# Patient Record
(demographics unavailable — no encounter records)

---

## 2024-12-14 NOTE — HISTORY OF PRESENT ILLNESS
[de-identified] : fever started yesterday tmax 102, sleeping more still eating well per mom  more drooling mom thinks possible teething pt pulling at both ears, last dose mot at 1230  [FreeTextEntry6] : in addition to above: fever x 2 days touching ears this AM No V/D Good w/d not tolerating po antipyretics, mom put it in his bottle at noon, but patient would not finish it, mom having trouble giving it to him orally

## 2024-12-14 NOTE — PHYSICAL EXAM
[Normocephalic] : normocephalic [Clear Rhinorrhea] : clear rhinorrhea [Erythematous Oropharynx] : erythematous oropharynx [Tooth Eruption] : tooth eruption  [Inflamed Ging[place]] : gingiva not inflamed [Vesicles] : no vesicles [Exudate] : no exudate [Ulcerative Lesions] : no ulcerative lesions [NL] : warm, clear

## 2024-12-14 NOTE — PLAN
[TextEntry] : VIRAL INFECTION:   - Discussed viral etiology and rationale for treatment - Maintain hydration, make sure your child drinks small sips of fluids throughout the day. It's normal that they're not eating like they usually do, but hydration is key - Symptomatic treatment with acetaminophen or ibuprofen prn - Supportive care with fluids and rest - Follow up if symptoms are worsening

## 2025-01-23 NOTE — PHYSICAL EXAM
[Alert] : alert [Normocephalic] : normocephalic [Closed Anterior [place]] : closed anterior fontanelle [Red Reflex] : red reflex bilateral [PERRL] : PERRL [Normally Placed Ears] : normally placed ears [Auricles Well Formed] : auricles well formed [Clear Tympanic membranes] : clear tympanic membranes [Light reflex present] : light reflex present [Bony landmarks visible] : bony landmarks visible [Nares Patent] : nares patent [Palate Intact] : palate intact [Uvula Midline] : uvula midline [Tooth Eruption] : tooth eruption [Supple, full passive range of motion] : supple, full passive range of motion [Symmetric Chest Rise] : symmetric chest rise [Regular Rate and Rhythm] : regular rate and rhythm [S1, S2 present] : S1, S2 present [+2 Femoral Pulses] : (+) 2 femoral pulses [Soft] : soft [Bowel Sounds] : normoactive bowel sounds [Central Urethral Opening] : central urethral opening [Testicles Descended] : testicles descended bilaterally [No Abnormal Lymph Nodes Palpated] : no abnormal lymph nodes palpated [Symmetric Abduction and Rotation of Hips] : symmetric abduction and rotation of hips [Straight] : straight [Cranial Nerves Grossly Intact] : cranial nerves grossly intact [Discharge] : no discharge [Palpable Masses] : no palpable masses [Murmurs] : no murmurs [Tender] : nontender [Distended] : nondistended [Splenomegaly] : no splenomegaly [Hepatomegaly] : no hepatomegaly [Allis Sign] : negative Allis sign [Rash or Lesions] : no rash/lesions [de-identified] : rhonchi b/l, good air entry b/l. AFTER ALBUTEROL X 1 MUCH IMPROVEMENT

## 2025-01-23 NOTE — DISCUSSION/SUMMARY
[FreeTextEntry1] : NEBS AS DISCUSSED TID Q4-6 HR PRN IF WORSENING OR FEVER, RTO OTHERWISE F/U 5-7 DAYS/ IF IMPROVED, OK FOR VACCINES  Transition to whole cow's milk. Continue table foods, 3 meals with 2-3 snacks per day. Incorporate up to 6 oz of flourinated water daily in a sippy cup. Brush teeth twice a day with soft toothbrush. Recommend visit to dentist. When in car, keep child in rear-facing car seats until age 2, or until  the maximum height and weight for seat is reached. Put baby to sleep in own crib with no loose or soft bedding. Lower crib matress. Help baby to maintain consistent daily routines and sleep schedule. Recognize stranger and separation anxiety. Ensure home is safe since baby is increasingly mobile. Be within arm's reach of baby at all times. Use consistent, positive discipline. Avoid screen time. Read aloud to baby. f/u for 15 month [facility]

## 2025-01-23 NOTE — PHYSICAL EXAM
[Alert] : alert [Normocephalic] : normocephalic [Closed Anterior [place]] : closed anterior fontanelle [Red Reflex] : red reflex bilateral [PERRL] : PERRL [Normally Placed Ears] : normally placed ears [Auricles Well Formed] : auricles well formed [Clear Tympanic membranes] : clear tympanic membranes [Light reflex present] : light reflex present [Bony landmarks visible] : bony landmarks visible [Nares Patent] : nares patent [Palate Intact] : palate intact [Uvula Midline] : uvula midline [Tooth Eruption] : tooth eruption [Supple, full passive range of motion] : supple, full passive range of motion [Symmetric Chest Rise] : symmetric chest rise [Regular Rate and Rhythm] : regular rate and rhythm [S1, S2 present] : S1, S2 present [+2 Femoral Pulses] : (+) 2 femoral pulses [Soft] : soft [Bowel Sounds] : normoactive bowel sounds [Central Urethral Opening] : central urethral opening [Testicles Descended] : testicles descended bilaterally [No Abnormal Lymph Nodes Palpated] : no abnormal lymph nodes palpated [Symmetric Abduction and Rotation of Hips] : symmetric abduction and rotation of hips [Straight] : straight [Cranial Nerves Grossly Intact] : cranial nerves grossly intact [Discharge] : no discharge [Palpable Masses] : no palpable masses [Murmurs] : no murmurs [Tender] : nontender [Distended] : nondistended [Splenomegaly] : no splenomegaly [Hepatomegaly] : no hepatomegaly [Allis Sign] : negative Allis sign [Rash or Lesions] : no rash/lesions [de-identified] : rhonchi b/l, good air entry b/l. AFTER ALBUTEROL X 1 MUCH IMPROVEMENT

## 2025-01-23 NOTE — DISCUSSION/SUMMARY
[FreeTextEntry1] : NEBS AS DISCUSSED TID Q4-6 HR PRN IF WORSENING OR FEVER, RTO OTHERWISE F/U 5-7 DAYS/ IF IMPROVED, OK FOR VACCINES  Transition to whole cow's milk. Continue table foods, 3 meals with 2-3 snacks per day. Incorporate up to 6 oz of flourinated water daily in a sippy cup. Brush teeth twice a day with soft toothbrush. Recommend visit to dentist. When in car, keep child in rear-facing car seats until age 2, or until  the maximum height and weight for seat is reached. Put baby to sleep in own crib with no loose or soft bedding. Lower crib matress. Help baby to maintain consistent daily routines and sleep schedule. Recognize stranger and separation anxiety. Ensure home is safe since baby is increasingly mobile. Be within arm's reach of baby at all times. Use consistent, positive discipline. Avoid screen time. Read aloud to baby. f/u for 15 month [facility]

## 2025-01-23 NOTE — HISTORY OF PRESENT ILLNESS
[FreeTextEntry1] : FEEDING: Tolerating feeds well. BF- formula every 2-3 hours. SLEEP:  No issues. ELIMINATION: Frequent urination. Stools daily, soft. SAFETY: Rear facing car seat No exposure to cigarette smoking. CONCERNS: cough x 2 weeks no labored breahting no fever feeding well sleeping good mom hears noisy breathing no neb at home

## 2025-01-23 NOTE — REVIEW OF SYSTEMS
[Nasal Congestion] : nasal congestion [Cough] : cough [Negative] : Genitourinary Cibinqo Pregnancy And Lactation Text: It is unknown if this medication will adversely affect pregnancy or breast feeding.  You should not take this medication if you are currently pregnant or planning a pregnancy or while breastfeeding.

## 2025-03-06 NOTE — DISCUSSION/SUMMARY
[FreeTextEntry1] : looks like tail end of illness lesions improving- keep an eye out for any new lesions if none, can return to  monday  rec returning this coming for 12 month vaccines father agrees with plan

## 2025-03-06 NOTE — PHYSICAL EXAM
[NL] : moves all extremities x4, warm, well perfused x4 [de-identified] : crusted over lesions lower face/chin. macules that dara few palms and soles of feet no blisters

## 2025-03-06 NOTE — HISTORY OF PRESENT ILLNESS
[de-identified] : As per Parent, Pt HERE FOR RECHECK OF [employer], STILL HAVING BLISTERS ON MOUTH. [FreeTextEntry6] : no fever good po noticed bumps on hands and feet also lesions face crusted over lesions hands feet were never blisters nasal congestion

## 2025-04-03 NOTE — PHYSICAL EXAM
[Alert] : alert [No Acute Distress] : no acute distress [Normocephalic] : normocephalic [Anterior [place] Closed] : anterior fontanelle closed [Red Reflex Bilateral] : red reflex bilateral [PERRL] : PERRL [Normally Placed Ears] : normally placed ears [Auricles Well Formed] : auricles well formed [Clear Tympanic membranes with present light reflex and bony landmarks] : clear tympanic membranes with present light reflex and bony landmarks [No Discharge] : no discharge [Nares Patent] : nares patent [Palate Intact] : palate intact [Uvula Midline] : uvula midline [Tooth Eruption] : tooth eruption  [Supple, full passive range of motion] : supple, full passive range of motion [No Palpable Masses] : no palpable masses [Symmetric Chest Rise] : symmetric chest rise [Clear to Auscultation Bilaterally] : clear to auscultation bilaterally [Regular Rate and Rhythm] : regular rate and rhythm [S1, S2 present] : S1, S2 present [No Murmurs] : no murmurs [+2 Femoral Pulses] : +2 femoral pulses [Soft] : soft [NonTender] : non tender [Non Distended] : non distended [Normoactive Bowel Sounds] : normoactive bowel sounds [No Hepatomegaly] : no hepatomegaly [No Splenomegaly] : no splenomegaly [Central Urethral Opening] : central urethral opening [Testicles Descended Bilaterally] : testicles descended bilaterally [Patent] : patent [Normally Placed] : normally placed [No Abnormal Lymph Nodes Palpated] : no abnormal lymph nodes palpated [No Clavicular Crepitus] : no clavicular crepitus [Negative Pe[place]-Ortalani] : negative Perea-Ortalani [Symmetric Buttocks Creases] : symmetric buttocks creases [No Spinal Dimple] : no spinal dimple [NoTuft of Hair] : no tuft of hair [Cranial Nerves Grossly Intact] : cranial nerves grossly intact [No Rash or Lesions] : no rash or lesions [de-identified] : upper lip toward right healed small lac, no crusting no swelling. teeth no discoloration no swelling or discoloration of gums

## 2025-04-03 NOTE — DISCUSSION/SUMMARY
[Communication and Social Development] : communication and social development [Sleep Routines and Issues] : sleep routines and issues [Temper Tantrums and Discipline] : temper tantrums and discipline [Healthy Teeth] : healthy teeth [Safety] : safety [] : The components of the vaccine(s) to be administered today are listed in the plan of care. The disease(s) for which the vaccine(s) are intended to prevent and the risks have been discussed with the caretaker.  The risks are also included in the appropriate vaccination information statements which have been provided to the patient's caregiver.  The caregiver has given consent to vaccinate. [FreeTextEntry1] : if any discoloration of gums or teeth, to dentist area already looks healed up observe closely for redness or swelling  Continue whole cow's milk. Continue table foods, 3 meals with 2-3 snacks per day. Incorporate flourinated water daily in a sippy cup. Brush teeth twice a day with soft toothbrush. Recommend visit to dentist. When in car, keep child in rear-facing car seats until age 2, or until  the maximum height and weight for seat is reached. Put baby to sleep in own crib. Lower crib matress. Help baby to maintain consistent daily routines and sleep schedule. Recognize stranger and separation anxiety. Ensure home is safe since baby is increasingly mobile. Be within arm's reach of baby at all times. Use consistent, positive discipline. Read aloud to baby.  Return in 3 mo for 18 mo well child check.

## 2025-04-03 NOTE — HISTORY OF PRESENT ILLNESS
[Mother] : mother [FreeTextEntry7] : 15 mo [facility] [FreeTextEntry1] :  Patient brought here by parent.  Patient tolerating feeds well. Table food TID. Drinks milk BID, water. Using cup. Sleeping well. Normal BM.s No concerns with behavior. Brushing teeth.  CONCERNS: fell off ride on toy hit mouth upper lip bled a lot and was swollen now looks fine, no tooth discoloration eating fine

## 2025-04-03 NOTE — DEVELOPMENTAL MILESTONES
[Uses 3 words other than names] : uses 3 words other than names [Speaks in sounds that seem like] : speaks in sounds that seem like an unknown language [FreeTextEntry1] : [place] PRESCREENING DEVELOPMENTAL QUESTIONNAIRE REVIEWED. PASSED ALL AGE APPROPRIATE DEVELOPMENTAL  MILESTONES.

## 2025-05-21 NOTE — HISTORY OF PRESENT ILLNESS
[de-identified] : Mom reports pt has been with a fever, cough and congestion since saturday. Tmax of 102 yesterday. Afebrile today. Diarrhea x1 yesterday as per mom. Mom denied rectal temp. [FreeTextEntry6] : 3 days of fever, wet cough, rattling sound in chest with breathing. Appetite is decreased but he is drinking well.  Has nebulizer device at home. Has not needed albuterol- mom states it makes him very jittery.

## 2025-05-21 NOTE — PHYSICAL EXAM
[Clear] : right tympanic membrane not clear [Erythema] : erythema [Bulging] : bulging [Clear Rhinorrhea] : clear rhinorrhea [NL] : warm, clear [FreeTextEntry7] : transmitted upper airway sounds

## 2025-05-21 NOTE — DISCUSSION/SUMMARY
[FreeTextEntry1] : Saline neb given. Complete 10 days of antibiotic. Provide ibuprofen or acetaminophen as needed for pain or fever. If no improvement within 72 hours return for re-evaluation. Follow up in 2-3 wks Supportive measures for upper respiratory infection were discussed. Such measures include use of nasal saline and suction as needed to clear the nasal passages, increasing fluids, hot showers or steam from the bathroom, propping the child up on a second pillow (for children > 1year old), use of an OTC home remedy such as vapo rub for comfort and giving 1 tablespoon of honey an hour before bedtime for cough.  Ibuprofen or acetaminophen can be used for fever or pain as per 's instructions. Return for new or worsening symptoms.

## 2025-07-09 NOTE — PHYSICAL EXAM
[Alert] : alert [No Acute Distress] : no acute distress [Normocephalic] : normocephalic [Anterior [place] Closed] : anterior fontanelle closed [Red Reflex Bilateral] : red reflex bilateral [PERRL] : PERRL [Normally Placed Ears] : normally placed ears [Auricles Well Formed] : auricles well formed [Clear Tympanic membranes with present light reflex and bony landmarks] : clear tympanic membranes with present light reflex and bony landmarks [No Discharge] : no discharge [Nares Patent] : nares patent [Palate Intact] : palate intact [Uvula Midline] : uvula midline [Tooth Eruption] : tooth eruption  [Supple, full passive range of motion] : supple, full passive range of motion [No Palpable Masses] : no palpable masses [Symmetric Chest Rise] : symmetric chest rise [Clear to Auscultation Bilaterally] : clear to auscultation bilaterally [Regular Rate and Rhythm] : regular rate and rhythm [S1, S2 present] : S1, S2 present [No Murmurs] : no murmurs [+2 Femoral Pulses] : +2 femoral pulses [Soft] : soft [NonTender] : non tender [Non Distended] : non distended [Normoactive Bowel Sounds] : normoactive bowel sounds [No Hepatomegaly] : no hepatomegaly [No Splenomegaly] : no splenomegaly [Central Urethral Opening] : central urethral opening [Testicles Descended Bilaterally] : testicles descended bilaterally [Patent] : patent [Normally Placed] : normally placed [No Abnormal Lymph Nodes Palpated] : no abnormal lymph nodes palpated [No Clavicular Crepitus] : no clavicular crepitus [Symmetric Buttocks Creases] : symmetric buttocks creases [No Spinal Dimple] : no spinal dimple [NoTuft of Hair] : no tuft of hair [Cranial Nerves Grossly Intact] : cranial nerves grossly intact [No Rash or Lesions] : no rash or lesions

## 2025-07-09 NOTE — HISTORY OF PRESENT ILLNESS
[FreeTextEntry7] : 18 Month [facility] [FreeTextEntry1] : Patient brought here by parent.  Patient tolerating feeds well. Table food TID. Drinks milk BID, water. Using cup. Sleeping well. Normal BM.s No concerns with behavior. Brushing teeth  has low grade fever 2 days ago seems fine now mild congestion

## 2025-07-09 NOTE — DISCUSSION/SUMMARY
[Family Support] : family support [Child Development and Behavior] : child development and behavior [Language Promotion/Hearing] : language promotion/hearing [Toliet Training Readiness] : toliet training readiness [Safety] : safety [] : The components of the vaccine(s) to be administered today are listed in the plan of care. The disease(s) for which the vaccine(s) are intended to prevent and the risks have been discussed with the caretaker.  The risks are also included in the appropriate vaccination information statements which have been provided to the patient's caregiver.  The caregiver has given consent to vaccinate. [FreeTextEntry1] : Continue whole cow's milk. Continue table foods, 3 meals with 2-3 snacks per day. Incorporate flourinated water daily in a sippy cup. Brush teeth twice a day with soft toothbrush. Recommend visit to dentist. When in car, keep child in rear-facing car seats until age 2, or until  the maximum height and weight for seat is reached. Put todder to sleep in own bed or crib. Help toddler to maintain consistent daily routines and sleep schedule. Toilet training discussed. Recognize anxiety in new settings. Ensure home is safe. Be within arm's reach of toddler at all times. Use consistent, positive discipline. Read aloud to toddler. Follow up for 2 year [facility]

## 2025-07-09 NOTE — DEVELOPMENTAL MILESTONES
[Uses 6 to 10 words other than] : uses 6 to 10 words other than names [Identifies at least 2 body parts] : identifies at least 2 body parts [Yes] : Completed. [Passed] : passed [FreeTextEntry1] : 0

## 2025-07-11 NOTE — DISCUSSION/SUMMARY
[FreeTextEntry1] : Symptomatic treatment advised Maintain adequate hydration Cool mist humidifier Skin care discussed Saline nose drops and bulb suctioning as needed Stressed handwashing and infection control Pay close observation for new or worsening symptoms Instructed to return to office if condition worsens or new symptoms arise Go to ER or UC if condition worsens or unable to to get to the office or after office hours

## 2025-07-11 NOTE — HISTORY OF PRESENT ILLNESS
[de-identified] : as per mom had Dtap on Wed now has temp and red bumps all over back stomach and face

## 2025-07-11 NOTE — PHYSICAL EXAM
[NL] : no abnormal lymph nodes palpated [Erythematous] : erythematous [Papules] : papules [Cheeks] : cheeks [Trunk] : trunk